# Patient Record
Sex: FEMALE | Race: ASIAN | NOT HISPANIC OR LATINO | URBAN - METROPOLITAN AREA
[De-identification: names, ages, dates, MRNs, and addresses within clinical notes are randomized per-mention and may not be internally consistent; named-entity substitution may affect disease eponyms.]

---

## 2018-06-23 ENCOUNTER — EMERGENCY (EMERGENCY)
Facility: HOSPITAL | Age: 48
LOS: 1 days | Discharge: HOME | End: 2018-06-23
Attending: EMERGENCY MEDICINE | Admitting: EMERGENCY MEDICINE
Payer: MEDICAID

## 2018-06-23 VITALS
SYSTOLIC BLOOD PRESSURE: 154 MMHG | HEART RATE: 88 BPM | WEIGHT: 151.02 LBS | TEMPERATURE: 99 F | OXYGEN SATURATION: 98 % | DIASTOLIC BLOOD PRESSURE: 86 MMHG | RESPIRATION RATE: 16 BRPM | HEIGHT: 68.5 IN

## 2018-06-23 PROCEDURE — 99283 EMERGENCY DEPT VISIT LOW MDM: CPT | Mod: 25

## 2018-06-23 PROCEDURE — 90471 IMMUNIZATION ADMIN: CPT

## 2018-06-23 PROCEDURE — 90715 TDAP VACCINE 7 YRS/> IM: CPT

## 2018-06-23 PROCEDURE — 99283 EMERGENCY DEPT VISIT LOW MDM: CPT

## 2018-06-23 RX ORDER — TETANUS TOXOID, REDUCED DIPHTHERIA TOXOID AND ACELLULAR PERTUSSIS VACCINE, ADSORBED 5; 2.5; 8; 8; 2.5 [IU]/.5ML; [IU]/.5ML; UG/.5ML; UG/.5ML; UG/.5ML
0.5 SUSPENSION INTRAMUSCULAR ONCE
Qty: 0 | Refills: 0 | Status: COMPLETED | OUTPATIENT
Start: 2018-06-23 | End: 2018-06-23

## 2018-06-23 RX ORDER — CIPROFLOXACIN HCL 0.3 %
2 DROPS OPHTHALMIC (EYE) ONCE
Qty: 0 | Refills: 0 | Status: COMPLETED | OUTPATIENT
Start: 2018-06-23 | End: 2018-06-23

## 2018-06-23 RX ADMIN — TETANUS TOXOID, REDUCED DIPHTHERIA TOXOID AND ACELLULAR PERTUSSIS VACCINE, ADSORBED 0.5 MILLILITER(S): 5; 2.5; 8; 8; 2.5 SUSPENSION INTRAMUSCULAR at 14:10

## 2018-06-23 NOTE — ED PROVIDER NOTE - PHYSICAL EXAMINATION
L eye examined with fluorescein. No hyphema, No sidels, No dendritic lesions.  + SMALL CONJUNCTIVAL ABRASION 6 OCLOCK.  VISION 20/20 R 202/ L EYE

## 2018-06-23 NOTE — ED PROVIDER NOTE - MEDICAL DECISION MAKING DETAILS
pt with conjuncitval abrasion pt with conjunctival abrasion. will give cipro drops in ed and advise to follow up with ophthalmologist. All questions answered and concerns addressed. pt verbalized understanding and agreement with plan and dx. pt advised to follow up with PMD. pt advised to return to ed for worsenng symptoms including fever, cp, sob. will dc.
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
## 2018-06-23 NOTE — ED PROVIDER NOTE - CHPI ED SYMPTOMS NEG
no photophobia/no purulent drainage/no pain/no discharge/no eye lid swelling/no blurred vision/no double vision/no drainage/no itching
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
## 2018-06-23 NOTE — ED PROVIDER NOTE - OBJECTIVE STATEMENT
pt is a 48yo female with no significant pmhx c/o eye pain x today. pt reports she was gardening and the rake hit her in the ace, left side. pt reports left eye "cut" and pain, FB sensation. pt did not do anything for symptoms. pt tetanus is not up to date. pt denies vision changes, discharge from eye, cp, sob. pt is a 46yo female with no significant pmhx c/o eye pain x today. pt reports she was gardening and the rake hit her in the ace, left side. pt reports left eye "cut" and pain, FB sensation. pt did not do anything for symptoms. pt tetanus is not up to date. pt denies vision changes, discharge from eye, cp, sob.   used : Frederick Cruz #[id number]

## 2018-06-23 NOTE — ED PROVIDER NOTE - SKIN, MLM
Skin normal color for race, warm, dry and intact. No evidence of rash. + small abrasion left eyelid, superficial, + small abrasion forehead.

## 2020-10-07 NOTE — ED ADULT NURSE NOTE - CAS EDN DISCHARGE ASSESSMENT
Carmen Wilson is a 47 y.o. female and presents with Leg Pain    She says she fell around 1 month ago, in the stairs, she hit her right buttockes, had a hematoma which is not healed and is painful  She has been having stiffness in her right hand and all fingers recently she says, but she can not tell me exactly how long ago, no numbness or tingling, no weakness. Has also pain in both of her knees, she feels they \"lock up\" frequently. No swelling, no erythema, no warmth     She says she's very depressed, the mirtazapine is making her very sleepy in the morning, so she stopped it, she took it only 2 days. She has been taking lorazepam which she feels help with anxiety. She needs a refill. Review of Systems  Review of Systems   Constitutional: Negative for chills, fatigue, fever and unexpected weight change. HENT: Negative for congestion, ear pain, sneezing and sore throat. Eyes: Negative for pain and discharge. Respiratory: Negative for cough, shortness of breath and wheezing. Cardiovascular: Negative for chest pain, palpitations and leg swelling. Gastrointestinal: Negative for abdominal pain, blood in stool, constipation and diarrhea. Endocrine: Negative for polydipsia and polyuria. Genitourinary: Negative for difficulty urinating, dysuria, frequency, hematuria and urgency. Musculoskeletal: Positive for arthralgias. Negative for back pain and joint swelling. Skin: Negative for rash. Allergic/Immunologic: Negative for environmental allergies and food allergies. Neurological: Negative for dizziness, speech difficulty, weakness, light-headedness, numbness and headaches. Hematological: Negative for adenopathy. Psychiatric/Behavioral: Negative for behavioral problems (Depression), sleep disturbance and suicidal ideas.           Past Medical History:   Diagnosis Date    Depression     Hypertension      Past Surgical History:   Procedure Laterality Date    HX HERNIA REPAIR       Social History     Socioeconomic History    Marital status: SINGLE     Spouse name: Not on file    Number of children: Not on file    Years of education: Not on file    Highest education level: Not on file   Tobacco Use    Smoking status: Never Smoker    Smokeless tobacco: Never Used   Substance and Sexual Activity    Alcohol use: Yes     Comment: occ    Drug use: Never     Family History   Problem Relation Age of Onset    Heart Disease Mother     Diabetes Mother     Diabetes Father     Hypertension Father      Current Outpatient Medications   Medication Sig Dispense Refill    sertraline (ZOLOFT) 50 mg tablet Take 1 Tab by mouth daily for 30 days. 30 Tab 2    naproxen (NAPROSYN) 500 mg tablet Take 1 Tab by mouth two (2) times daily (with meals). As needed 60 Tab 1    metoprolol succinate (TOPROL-XL) 100 mg tablet Take 1 Tab by mouth daily for 60 days. 30 Tab 1    amLODIPine (NORVASC) 10 mg tablet Take 1 Tab by mouth daily for 60 days. 30 Tab 1    famotidine (PEPCID) 20 mg tablet Take 1 Tab by mouth two (2) times a day. 60 Tab 0     Allergies   Allergen Reactions    Sulfa (Sulfonamide Antibiotics) Other (comments)       Objective:  Visit Vitals  /72 (BP 1 Location: Left arm, BP Patient Position: Sitting)   Pulse (!) 48   Temp 98 °F (36.7 °C) (Oral)   Ht 5' 5\" (1.651 m)   Wt 182 lb 3.2 oz (82.6 kg)   SpO2 100% Comment: RA   BMI 30.32 kg/m²     Physical Exam:   Physical Exam  Constitutional:       General: She is not in acute distress. Appearance: Normal appearance. HENT:      Head: Normocephalic and atraumatic. Mouth/Throat:      Mouth: Mucous membranes are moist.   Eyes:      Extraocular Movements: Extraocular movements intact. Conjunctiva/sclera: Conjunctivae normal.      Pupils: Pupils are equal, round, and reactive to light. Neck:      Musculoskeletal: Normal range of motion and neck supple. Cardiovascular:      Rate and Rhythm: Normal rate and regular rhythm.       Pulses: Normal pulses. Heart sounds: Normal heart sounds. Pulmonary:      Effort: Pulmonary effort is normal.      Breath sounds: Normal breath sounds. Abdominal:      General: Abdomen is flat. Bowel sounds are normal. There is no distension. Palpations: Abdomen is soft. There is no mass. Tenderness: There is no abdominal tenderness. Musculoskeletal:         General: No swelling or deformity. Right lower leg: No edema. Left lower leg: No edema. Comments: Right buttocks: Indurated and hyperpigmented area of around 8 cms diameter, tender. Lymphadenopathy:      Cervical: No cervical adenopathy. Skin:     General: Skin is warm and dry. Capillary Refill: Capillary refill takes less than 2 seconds. Coloration: Skin is not jaundiced or pale. Findings: No erythema or rash. Neurological:      General: No focal deficit present. Mental Status: She is alert and oriented to person, place, and time. Psychiatric:         Mood and Affect: Mood normal.         Behavior: Behavior normal.         Thought Content: Thought content normal.         Judgment: Judgment normal.          Results for orders placed or performed in visit on 08/25/20   CBC WITH AUTOMATED DIFF   Result Value Ref Range    WBC 6.4 3.4 - 10.8 x10E3/uL    RBC 4.64 3.77 - 5.28 x10E6/uL    HGB 13.7 11.1 - 15.9 g/dL    HCT 45.0 34.0 - 46.6 %    MCV 97 79 - 97 fL    MCH 29.5 26.6 - 33.0 pg    MCHC 30.4 (L) 31.5 - 35.7 g/dL    RDW 14.3 11.7 - 15.4 %    PLATELET 316 860 - 017 x10E3/uL    NEUTROPHILS 49 Not Estab. %    Lymphocytes 36 Not Estab. %    MONOCYTES 12 Not Estab. %    EOSINOPHILS 2 Not Estab. %    BASOPHILS 1 Not Estab. %    ABS. NEUTROPHILS 3.2 1.4 - 7.0 x10E3/uL    Abs Lymphocytes 2.3 0.7 - 3.1 x10E3/uL    ABS. MONOCYTES 0.8 0.1 - 0.9 x10E3/uL    ABS. EOSINOPHILS 0.1 0.0 - 0.4 x10E3/uL    ABS. BASOPHILS 0.1 0.0 - 0.2 x10E3/uL    IMMATURE GRANULOCYTES 0 Not Estab. %    ABS. IMM.  GRANS. 0.0 0.0 - 0.1 x10E3/uL METABOLIC PANEL, COMPREHENSIVE   Result Value Ref Range    Glucose 65 65 - 99 mg/dL    BUN 20 6 - 24 mg/dL    Creatinine 1.32 (H) 0.57 - 1.00 mg/dL    GFR est non-AA 46 (L) >59 mL/min/1.73    GFR est AA 53 (L) >59 mL/min/1.73    BUN/Creatinine ratio 15 9 - 23    Sodium 141 134 - 144 mmol/L    Potassium 4.1 3.5 - 5.2 mmol/L    Chloride 100 96 - 106 mmol/L    CO2 26 20 - 29 mmol/L    Calcium 9.7 8.7 - 10.2 mg/dL    Protein, total 7.0 6.0 - 8.5 g/dL    Albumin 4.2 3.8 - 4.9 g/dL    GLOBULIN, TOTAL 2.8 1.5 - 4.5 g/dL    A-G Ratio 1.5 1.2 - 2.2    Bilirubin, total <0.2 0.0 - 1.2 mg/dL    Alk. phosphatase 97 39 - 117 IU/L    AST (SGOT) 15 0 - 40 IU/L    ALT (SGPT) 15 0 - 32 IU/L   TSH 3RD GENERATION   Result Value Ref Range    TSH 2.480 0.450 - 4.500 uIU/mL   T4, FREE   Result Value Ref Range    T4, Free 1.23 0.82 - 1.77 ng/dL   LIPID PANEL   Result Value Ref Range    Cholesterol, total 170 100 - 199 mg/dL    Triglyceride 79 0 - 149 mg/dL    HDL Cholesterol 52 >39 mg/dL    VLDL, calculated 16 5 - 40 mg/dL    LDL, calculated 102 (H) 0 - 99 mg/dL       Assessment/Plan:    severe depression,, needs to see psychiatry. She did not tolerate mirtazapine, she has stopped it, start sertraline which she has tolerated in the past. She has a counselor. She has crepitations of both knees, likely OA, will do xrays to evaluate severity, likely with her hands. She can take naproxen as needed only   Findings in right buttocsks described in PE, this happened after a fall a month ago, will order US soft tissue. ICD-10-CM ICD-9-CM    1. Severe major depression (HCC)  F32.2 296.23 REFERRAL TO PSYCHIATRY      sertraline (ZOLOFT) 50 mg tablet   2. Chronic pain of both knees  M25.561 719.46 XR KNEE LT MAX 2 VWS    M25.562 338.29 XR KNEE RT MAX 2 VWS    G89.29  naproxen (NAPROSYN) 500 mg tablet   3. Stiffness of left hand joint  M25.642 719.54    4.  Stiffness of joints of both hands  M25.641 719.58 XR HAND LT MIN 3 V    M25.642  XR HAND RT MIN 3 V   5. Hematoma of right hip, subsequent encounter  S70. 01XD V58.89 US EXT NONVAS RT COMP     924.01      Orders Placed This Encounter    XR KNEE LT MAX 2 VWS     Standing Status:   Future     Standing Expiration Date:   11/7/2021    XR KNEE RT MAX 2 VWS     Standing Status:   Future     Standing Expiration Date:   11/7/2021    XR HAND LT MIN 3 V     Standing Status:   Future     Standing Expiration Date:   11/7/2021    XR HAND RT MIN 3 V     Standing Status:   Future     Standing Expiration Date:   11/7/2021    US EXT NONVAS RT COMP     Hematoma in right buttocks since fall more than 1 month ago, painful and tender     Standing Status:   Future     Standing Expiration Date:   11/7/2021     Order Specific Question:   Specific Body Part     Answer:   right buttocks    REFERRAL TO PSYCHIATRY     Referral Priority:   Routine     Referral Type:   Behavioral Health     Referral Reason:   Specialty Services Required     Referred to Provider:   Clarence Felix MD     Requested Specialty:   Psychiatry     Number of Visits Requested:   1    sertraline (ZOLOFT) 50 mg tablet     Sig: Take 1 Tab by mouth daily for 30 days. Dispense:  30 Tab     Refill:  2    naproxen (NAPROSYN) 500 mg tablet     Sig: Take 1 Tab by mouth two (2) times daily (with meals). As needed     Dispense:  60 Tab     Refill:  1       There are no Patient Instructions on file for this visit. Follow-up and Dispositions    · Return in about 2 months (around 12/7/2020). Alert and oriented to person, place and time

## 2025-02-27 NOTE — ED PROVIDER NOTE - LATERALITY
Manisha Martínez is a 16 y.o. who presents for a Contraception follow up visit       IUD Type:  Kyleena  Date placed: 11/2024    Bleeding pattern since placement: Periods lighter, regular monthly periods with 7 days of bleeding. Cramping still about the same as before her IUD    Physical Exam  Constitutional:       Appearance: Normal appearance.   Genitourinary:      Vulva normal.      IUD strings visualized.   HENT:      Head: Normocephalic and atraumatic.   Pulmonary:      Effort: Pulmonary effort is normal.   Musculoskeletal:         General: Normal range of motion.   Neurological:      General: No focal deficit present.      Mental Status: She is alert and oriented to person, place, and time.   Psychiatric:         Mood and Affect: Mood normal.         Behavior: Behavior normal.   Vitals reviewed.         Assessment/Plan    IUD string check  Pt is satisfied with this method of contraception.  Prescription for 600 mg Motrin sent to pharmacy for dysmenorrhea      Return To Care for annual exam and as needed    LAKEISHA Montoya       left